# Patient Record
Sex: MALE | Race: WHITE | Employment: FULL TIME | URBAN - NONMETROPOLITAN AREA
[De-identification: names, ages, dates, MRNs, and addresses within clinical notes are randomized per-mention and may not be internally consistent; named-entity substitution may affect disease eponyms.]

---

## 2020-07-31 LAB
BILIRUBIN URINE: NORMAL
BLOOD, URINE: NEGATIVE
CLARITY: CLEAR
COLOR: YELLOW
GLUCOSE URINE: NEGATIVE
KETONES, URINE: NEGATIVE
LEUKOCYTE ESTERASE, URINE: NEGATIVE
NITRITE, URINE: NEGATIVE
PH UA: 7 (ref 4.5–8)
PROTEIN UA: NEGATIVE
SPECIFIC GRAVITY UA: 1.01 (ref 1–1.03)
UROBILINOGEN, URINE: NORMAL

## 2021-04-20 ENCOUNTER — OFFICE VISIT (OUTPATIENT)
Dept: UROLOGY | Age: 41
End: 2021-04-20
Payer: COMMERCIAL

## 2021-04-20 VITALS
HEIGHT: 69 IN | BODY MASS INDEX: 25.42 KG/M2 | WEIGHT: 171.6 LBS | SYSTOLIC BLOOD PRESSURE: 122 MMHG | DIASTOLIC BLOOD PRESSURE: 60 MMHG

## 2021-04-20 DIAGNOSIS — N40.1 BENIGN LOCALIZED PROSTATIC HYPERPLASIA WITH LOWER URINARY TRACT SYMPTOMS (LUTS): ICD-10-CM

## 2021-04-20 DIAGNOSIS — R30.0 DYSURIA: Primary | ICD-10-CM

## 2021-04-20 LAB
BILIRUBIN URINE: NEGATIVE
BLOOD URINE, POC: NEGATIVE
CHARACTER, URINE: CLEAR
COLOR, URINE: YELLOW
GLUCOSE URINE: NEGATIVE MG/DL
KETONES, URINE: ABNORMAL
LEUKOCYTE CLUMPS, URINE: NEGATIVE
NITRITE, URINE: NEGATIVE
PH, URINE: 7 (ref 5–9)
PROTEIN, URINE: NEGATIVE MG/DL
SPECIFIC GRAVITY, URINE: 1.02 (ref 1–1.03)
UROBILINOGEN, URINE: 0.2 EU/DL (ref 0–1)

## 2021-04-20 PROCEDURE — 81003 URINALYSIS AUTO W/O SCOPE: CPT | Performed by: UROLOGY

## 2021-04-20 PROCEDURE — 99204 OFFICE O/P NEW MOD 45 MIN: CPT | Performed by: UROLOGY

## 2021-04-20 RX ORDER — TAMSULOSIN HYDROCHLORIDE 0.4 MG/1
0.4 CAPSULE ORAL DAILY
Qty: 90 CAPSULE | Refills: 3 | Status: SHIPPED | OUTPATIENT
Start: 2021-04-20 | End: 2022-05-12

## 2021-04-20 NOTE — PROGRESS NOTES
NAEDGE Gutierrez MD        [address] [id number]  Dept: [phone]  Dept Fax: [phone]: [address] [address] Urology Office Note -     Patient:  Gonsalo Pagan  YOB: 1980  Date: 4/21/2021    The patient is a 39 y.o. male who presents today for evaluation of the following problems:   Chief Complaint   Patient presents with    Advice Only     new patient dysuria     referred/consultation requested by DENISSE Ballard CNP. HISTORY OF PRESENT ILLNESS:     LUTS    Recently seen Dr. Lopez Duffy at [facility]     Pt presents to the office with  dysuria and frequency of urination. If he holds it he experiences bladder pain and when he tried to urinate he was unable to go. He was put on Mybetriq which did improve the frequency. He still reports bladder pain/weak stream/discomfort/pelvic tightness. He had a uroflow test which was normal. Pt has never been on an abx. Denies neurologic disorders.               Requested/reviewed records from DENISSE Ballard CNP office and/or outside [de-identified]    (Patient's old records have been requested, reviewed and pertinent findings summarized in today's note.)    Procedures Today: N/A      Last several PSA's:  No results found for: PSA    Last total testosterone:  No results found for: TESTOSTERONE    Urinalysis today:  Results for POC orders placed in visit on 04/20/21   POCT Urinalysis No Micro (Auto)   Result Value Ref Range    Glucose, Ur Negative NEGATIVE mg/dl    Bilirubin Urine Negative     Ketones, Urine Trace (A) NEGATIVE    Specific Gravity, Urine 1.025 1.002 - 1.030    Blood, UA POC Negative NEGATIVE    pH, Urine 7.00 5.0 - 9.0    Protein, Urine Negative NEGATIVE mg/dl    Urobilinogen, Urine 0.20 0.0 - 1.0 eu/dl    Nitrite, Urine Negative NEGATIVE    Leukocyte Clumps, Urine Negative NEGATIVE    Color, Urine Yellow YELLOW-STRAW    Character, Prescriptions Ordered:  Orders Placed This Encounter   Medications    tamsulosin (FLOMAX) 0.4 MG capsule     Sig: Take 1 capsule by mouth daily     Dispense:  90 capsule     Refill:  3      Orders Placed:  Orders Placed This Encounter   Procedures    POCT Urinalysis No Micro (Auto)            NADEGE Cast MD

## 2021-05-18 ENCOUNTER — PROCEDURE VISIT (OUTPATIENT)
Dept: UROLOGY | Age: 41
End: 2021-05-18
Payer: COMMERCIAL

## 2021-05-18 VITALS — WEIGHT: 171 LBS | BODY MASS INDEX: 25.33 KG/M2 | HEIGHT: 69 IN

## 2021-05-18 DIAGNOSIS — N40.1 BENIGN LOCALIZED PROSTATIC HYPERPLASIA WITH LOWER URINARY TRACT SYMPTOMS (LUTS): ICD-10-CM

## 2021-05-18 DIAGNOSIS — R30.0 DYSURIA: Primary | ICD-10-CM

## 2021-05-18 LAB
BILIRUBIN URINE: NEGATIVE
BLOOD URINE, POC: NEGATIVE
CHARACTER, URINE: CLEAR
COLOR, URINE: YELLOW
GLUCOSE URINE: NEGATIVE MG/DL
KETONES, URINE: NEGATIVE
LEUKOCYTE CLUMPS, URINE: NEGATIVE
NITRITE, URINE: NEGATIVE
PH, URINE: 7 (ref 5–9)
PROTEIN, URINE: NEGATIVE MG/DL
SPECIFIC GRAVITY, URINE: 1.01 (ref 1–1.03)
UROBILINOGEN, URINE: 0.2 EU/DL (ref 0–1)

## 2021-05-18 PROCEDURE — 52000 CYSTOURETHROSCOPY: CPT | Performed by: UROLOGY

## 2021-05-18 PROCEDURE — 81003 URINALYSIS AUTO W/O SCOPE: CPT | Performed by: UROLOGY

## 2021-05-18 NOTE — PROGRESS NOTES
NADEGE MAHMOOD [facility]        [address] [facility] [phone]  Dept: [phone]  Dept Fax: [phone]: [address] [address] Urology Office Note -     Patient:  Alanis Collado  YOB: 1980  Date: 5/18/2021    The patient is a 39 y.o. male who presents today for evaluation of the following problems:   Chief Complaint   Patient presents with    Follow-up     bph with dysuria-cysto today-sx not improved     referred/consultation requested by DENISSE Willis CNP. HISTORY OF PRESENT ILLNESS:     LUTS    Main complaint Intermittent stream accompanied by dysuria  Retrograde ejaculation with flomax   Urgency improved with flomax. All other symptoms still same  Pt denies any neurologic disorders    Summary of Previous Records:  Recently seen Dr. Jacob Eduardo at [facility]     Pt presents to the office with  dysuria and frequency of urination. If he holds it he experiences bladder pain and when he tried to urinate he was unable to go. He was put on Mybetriq which did improve the frequency. He still reports bladder pain/weak stream/discomfort/pelvic tightness. He had a uroflow test which was normal. Pt has never been on an abx. Denies neurologic disorders. Requested/reviewed records from DENISSE Willis CNP office and/or outside physician/EMR    (Patient's old records have been requested, reviewed and pertinent findings summarized in today's note.)    Procedures Today:             Cystoscopy Operative Note    Patient:  Alanis Collado  MRN: [id number]  YOB: 1980  Surgeon: Damaris Ralph MD  Anesthesia: Urethral 2% Xylocaine   Indications: BPH  Position: Supine    Findings:   The patient was prepped and draped in the usual sterile fashion. The flexible cystoscope was advanced through the urethra and into the bladder.   The bladder was thoroughly inspected and the following was noted: Never Used      (If patient a smoker, smoking cessation counseling offered)   Social History     Substance and Sexual Activity   Alcohol Use Yes       REVIEW OF SYSTEMS:  Constitutional: negative  Eyes: negative  Respiratory: negative  Cardiovascular: negative  Gastrointestinal: negative  Genitourinary: see HPI  Musculoskeletal: negative  Skin: negative   Neurological: negative  Hematological/Lymphatic: negative  Psychological: negative      Physical Exam:    This a 39 y.o. male  There were no vitals filed for this visit. Body mass index is 25.25 kg/m². Constitutional: Patient in no acute distress;       Assessment and Plan        1. Dysuria    2. Benign localized prostatic hyperplasia with lower urinary tract symptoms (LUTS)               Plan:        cystoscopy today. Cysto- cystitis, hypervascular, mild intravesical extension of prostate, LLH +1, - normal cysto otherwise  Continue Flomax if he wants. Pt opts to stop it. Refer to [facility] for pelvic floor therapy. Should try this first before any surgical intervention   Briefly discussed urolift   Return in 2-3 months to reassess symptoms          Prescriptions Ordered:  No orders of the defined types were placed in this encounter.      Orders Placed:  Orders Placed This Encounter   Procedures    POCT Urinalysis No Micro (Auto)    OK CYSTOURETHROSCOPY            NADEGE Mckinnon MD

## 2021-06-02 ENCOUNTER — HOSPITAL ENCOUNTER (OUTPATIENT)
Dept: PHYSICAL THERAPY | Age: 41
Setting detail: THERAPIES SERIES
Discharge: HOME | End: 2021-06-02
Payer: COMMERCIAL

## 2021-06-02 PROCEDURE — 97530 THERAPEUTIC ACTIVITIES: CPT

## 2021-06-02 PROCEDURE — 97161 PT EVAL LOW COMPLEX 20 MIN: CPT

## 2021-06-02 NOTE — PROGRESS NOTES
suffered from [facility] for some time. He noticed it started when he was driving long distances and trying to hold urine after drinking coffee. He had been taking Flowmax which he felt made things worse. Since he stopped taking this med his flow has been better, interrupted stream is not present as often. He notices interruption in stream mostly in morning with coffee. He is not having any trouble getting his stream started at this point. Pt is no longer taking anything for his bladder. Recent cystoscope was unremarkable with exception of narrowing of urethra and cysts that did not concern MD.  Pt states his teacher told him he had a weak bladder when he was in grade school. SUBJECTIVE: See above    Social/Functional History and Current Status:  Medications and Allergies have been reviewed and are listed on Medical History Questionnaire. Chantale Nowak lives with spouse in a single story home with stairs and a handrail to enter. Task Previous Current   ADLs  Independent Independent   IADL's Independent Independent   Ambulation Independent Independent   Transfers Independent Independent   Recreation Independent Independent   Community Integration Independent Independent   Driving Active  Active    Work The pt is a . Full-Time. He does heavy work. 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 When he bends over he gets urge to urinate, pressure in the bladder. Pt has noticed that his penis does retract when he rides in a [place]. When he stands up he feels a strong pull through penis and into lower abdomen. He had to manually release this spasm. This has not happened for 8 years. PAIN    Location Pain when his bladder is full and he tries to withhold, this has been better lately.      Description    Increased by    Decreased by    Maximum Intensity    Best Intensity    Todays Rating    Other/Function        History of Abuse:No history of physical, emotional or sexual abuse reported    BLADDER ASSESSMENT [] Deferred secondary to:   Daily Fluid Ingestion: 1 cup of coffee in morning, water 10 cups or more of water- pt works to stay hydrated. He rarely has a mountain dew. Urination Frequency Times/Day: Morning 4 x in two hour (passes a lot of urine). Pt not sure if he empties. This slows later in the day where pt can go 3-4 hours without voiding excessively. At work he may go longer stretches of time. Suggested voiding every three hours  Times/Night: This has resolved for the most part. He was getting up multiple times per night. Volume Pt estimates good volume   Urge Sensation Urgency has not been common   SYMPTOM QUESTIONNAIRE   Loses Urine Upon: Rarely with heavy lifting and bending at work   Incontinence Volume: Couple dribbles. Frequency of Leakage: Rarely   Wets the Bed: No   Burning/Pain with Urination: Only if stream is intermittent, not present if stream flows well. Difficulty Starting a Stream of Urine: Nothing recent, but pt did have residual volume at the doctors office that he did not feel. He filled a cup afterward with more urine. Incomplete Emptying Yes, but pt does not feel it. Strain to Empty Bladder: No   Falling Out Feeling: No   Urinate more than 7 times/day: Possibly   Use a form of Leakage Protection: No   Restrict Fluid Intake: No   Stream Strength Good, but do experience start and stop occasionally       BOWEL ASSESSMENT [] Deferred secondary to:   Frequency: Was daily but not every three days. Depends on hurry. Most Common Stool Consistency: Soft and long   SYMPTOM QUESTIONNAIRE   Strain to have a BM: Occasionally   Include fiber in your diet: Good diet   Take laxatives/enemas regularly: No   Pain with BM: Gas pains that can get severe. Strong urge to have BM: Rarely, with certain foods. Leak/Stain Feces: No   Diarrhea often:     Pt states he tends toward constipation.        SEXUAL ASSESSMENT [] Deferred secondary to:   Sexually Active Yes, no pain. Pain with [place] (Dyspareunia)    Painful Penetration    Lubrication Needed    Pain After [place]            GENERAL ASSESSMENT   [] Deferred secondary to:   Palpation    Observation    Posture Mild forward head and rounded shoulders   Range of Motion WFL LE's and spine, note loss of flexibility at end rage hips   Strength LE's 5/5, lower core 4/5   Gait NL   Sensation Intact   Edema None   Balance/Fall History NA   Special Tests Moderate tightness in the hip girdle           OBSERVATION  [] Deferred secondary to:   Patient Safety Patient offered a chaperone and declined. This pt was educated in the purpose and procedure of PFM examination to be completed today. He was educated in his right to refuse part or all of this activity, and to stop this activity if he becomes uncomfortable. He was educated that he is not obligated to give a reason and that it would not in any way impact his ability to seek further care with this therapist at this facility or result in therapist prejudice regarding his motivation to get better. He verbalized understanding and gave consent again for PFM examination to be completed today. Skin Condition Excellent   Urogenital Triangle Normal   Introitus     Perineal Descent     External Clock         PELVIC FLOOR INTERNAL EXAM [] Deferred secondary to:   Exam External   Sensation Intact   Muscle Localization Excellent once cues given   Palpation/Tone Elevated-moderate, delayed relaxation and incomplete last 30%. Pt demonstrates overall elevated tension of the PFM palpable through the skin with very mild tenderness accompanying this. Pelvic Floor Strength 2/5   Relax after Contraction Delayed, difficult,  Pt could perceive relaxation.      Prolapse NA           TREATMENT   Precautions:     X in shaded column indicates activity completed today   Modalities Parameters/  Location  Notes                     Manual Therapy Time/Technique  Notes         Education in manual therapy reynold-scrotal area TEACH NEXT VISIT           Exercise/Intervention    Notes   PFM a and P-inverse relationship between PFM and bladder  15' x    Tension and anxiety component of PFM tension  5' x    Camel bladder  5' x The pt is to void every 3 hours. Kegels quick and hold  5' x The pt holding 5 sec   PFM relaxation  5' x Belly relaxed, jaw relaxed, shoulders relaxed, sitting on toilet instead of standing. Importance of relaxing to void especially. Constipation    Comprehensive   Normal bladder function       Diet Impact on the bladder                     Adductor stretch       PFM stretch       Deep squat       OI stretch       Piriformis stretch                                          Pt questions  5' x      Specific Interventions Next Treatment: Pt education followed by stretching, manual therapy, and strengthening as needed    Activity/Treatment Tolerance:  [x]  Patient tolerated treatment well  []  Patient limited by fatigue  []  Patient limited by pain   []  Patient limited by medical complications  []  Other:     Patient Education: This pt was educated in the anatomy and viscera of the pelvic floor to promote insight into her own condition and to assist her in the identification of causative factors. The inverse relationship between the pelvic floor and viscera. He was educated in the importance of pelvic floor muscle relaxation to attain pain relief, improved bowel and bladder function using the model. Importance of PFM relaxation was stressed to the pt. The pt verbalized understanding of all. [x]  HEP/Education Completed:    VidSys Access Code:  []  No new Education completed  []  Reviewed Prior HEP      []  Patient verbalized and/or demonstrated understanding of education provided. []  Patient unable to verbalize and/or demonstrate understanding of education provided. Will continue education.   []  Barriers to learning:    Assessment: This pt presents to therapy with complaints of dysuria, intermittent stream, periods of urinary frequency, nocturia, constipation, and stress incontinence with bending at work (this is accompanied by feeling of pressure in the bladder). 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 Testing has revealed residual volume-significant amounts per pt. Upon examination he demonstrates PFM weakness and very high PFM tone, difficulty relaxing the PFM (though he can perceive relaxation), loss of PFM endurance, restricted hip girdle flexibility, and lack of insight into factors affecting condition expected of a lay person. These findings do support pt claim of difficulty in passing urine or maintaining urine stream, and tending toward constipation even though his stool is soft. He requires skilled PT to address these impairments with focus on normalizing PFM tone in order to facilitate bowel and bladder contraction for complete and easy empty of both. Without further PT pt is at risk for surgery (which has been suggested pending result of PT) and UTI due to retention of urine. Body Structures/Functions/Activity Limitations: PFM weakness with decreased localization and endurance, Poor PFM control with limited ability to completely relax, Decreased awareness of normal bladder and bowel habits, control, and diet effects on functioning, Abdominal and core weakness, Impaired coordination, Impaired muscle tone and Impaired strength  Prognosis: Good    GOALS:  Patient Goal: To improve ease of emptying the bladder and bowel  Short Term Goals: 6 weeks  1. Pt will be able to identify normal bladder/bowel function/voiding patterns, diet impact on the bladder/and bowel to enhance pt insight into potential causative factors and promote increased bladder control.      2. The pt will demonstrate well localized PFM contraction with ability to completely relax instantly in order to increase the efficacy of strengthening program and facilitate bowel and bladder contraction. 3. Pt will demonstrate independence with basic HEP designed to increase PFM and core strength and to enhance hip girdle flexibility for increased ease of strengthening. 5.  This pt will reduce nocturia to 1 times per night consistently to establish more restful sleep patterns. 6.  This pt will demo proper use of pelvic brace lifting, push, and pull with pelvic brace in order to promote continence during functional tasks. 7.  Pt to empty bladder every three hours in order to promote return of normal bladder sensation for improved emptying. Long Term Goals: 12 weeks  1. This pt will report a resolution of incontinence with bending and lifting at work to keep pt dry at his very labor intensive job  2. This pt will report normal urinary frequency with consistent complete empty to promote bladder health. 3. This pt will decrease IIQ and IUD scores to 0 respectively to indicate improved continence and efficacy of treatment  4. This pt will demo independence with advanced HEP in order to allow gains in therapy to be maintained long term and reduce the risk of further medical need/assessment. 5. This pt will demo PFM PERF 3/10/10/10 in order to increase pelvic organ support  6. The pt will abolish nocturia in order to attain restful sleep patterns. 7. The pt will verbalize constipation symptoms improved by 25% in order to reduce strain on the pelvic floor. PLAN:  Treatment Recommendations: Strengthening, Range of Motion, Balance Training, Transfer Training, Endurance Training, Gait Training, Stair Training, Neuromuscular Re-education, Manual Therapy - Soft Tissue Mobilization, Manual Therapy - Joint Manipulation, Pain Management, Home Exercise Program, Patient Education, Self-Care Education and Training and Modalities    [x]  Plan of care initiated.   Plan to see patient 1 times every other week for 12 weeks to address the treatment planned outlined above.   []  Continue with current plan of care  []  Modify plan of care as follows:    []  Hold pending physician visit  []  Discharge    Time In 1400   Time Out 1500   Timed Code Minutes: 40   Total Treatment Time: 60       Electronically Signed by: Lina Morrison PT

## 2021-06-15 ENCOUNTER — HOSPITAL ENCOUNTER (OUTPATIENT)
Dept: PHYSICAL THERAPY | Age: 41
Setting detail: THERAPIES SERIES
Discharge: HOME | End: 2021-06-15
Payer: COMMERCIAL

## 2021-06-15 PROCEDURE — 97530 THERAPEUTIC ACTIVITIES: CPT

## 2021-06-15 PROCEDURE — 97110 THERAPEUTIC EXERCISES: CPT

## 2021-06-15 NOTE — PROGRESS NOTES
[address] [facility]  PHYSICAL THERAPY  OUTPATIENT REHABILITATION - SPECIALIZED THERAPY SERVICES  [] PELVIC HEALTH EVALUATION  [x] DAILY NOTE  [] PROGRESS NOTE [] DISCHARGE NOTE    Date: 6/15/2021  Patient Name:  Mo Worthy  : 1980  MRN: [id number]  CSN: [id number]    Referring Practitioner Cayla Roman MD   Diagnosis Dysuria [R30.0]    Treatment Diagnosis M62.838, N39.3 - Stress Incontinence male  R35.1 - Nocturia  R35.0 - Frequency of Micturition  R39.12 - Poor Urinary Stream  R39.81 - Functional Urinary Incontinence  N40.1 - Enlarged Prostate with Lower Urinary Tract Symptoms  R39.11 - Hesitance of Micturition and K55.09 - Other Constipation  K59.00 - Constipation, Unspecified   Date of Evaluation 21    Additional Pertinent History BPH, pt was taking Flowmax but opted to discontinue, MD mckeon      Functional Outcome Measure Used IIQ and TRA   Functional Outcome Score 2 and 6 (21)       Insurance: Primary: Payor: Lopez Curry 150 /  /  / ,   Secondary:    Authorization Information: PRE CERTIFICATION REQUIRED: yes through [employer] [phone]  INSURANCE THERAPY BENEFIT:  20 visits per calendar year 0 used   Visit # 2, 2/10 for progress note   Visits Allowed: 21 Max, await AIM response   Recertification Date:    Physician Follow-Up:    Physician Orders:    History of Present Illness: The pt states that he has been suffered from [facility] for some time. He noticed it started when he was driving long distances and trying to hold urine after drinking coffee. He had been taking Flowmax which he felt made things worse. Since he stopped taking this med his flow has been better, interrupted stream is not present as often. He notices interruption in stream mostly in morning with coffee. He is not having any trouble getting his stream started at this point. Pt is no longer taking anything for his bladder.   Recent cystoscope was unremarkable with exception of narrowing of urethra and cysts that did not concern MD.  Pt states his teacher told him he had a weak bladder when he was in grade school. SUBJECTIVE:  The pt states that he is doing \"great\". He is able to do kegels when he thinks about it. Pt has had no urgency or pain other than a small amount of pain when stream stops. He notices that when he relaxes he is able to get more urine out. He does feel the bladder when bending over or kneeling down and this makes him feel bladder not emptying completely. The pt feels that frequency in the morning is due to coffee. Social/Functional History and Current Status:  Medications and Allergies have been reviewed and are listed on Medical History Questionnaire. Yolanda Ruggiero lives with spouse in a single story home with stairs and a handrail to enter. Task Previous Current   ADLs  Independent Independent   IADL's Independent Independent   Ambulation Independent Independent   Transfers Independent Independent   Recreation Independent Independent   Community Integration Independent Independent   Driving Active  Active    Work The pt is a . Full-Time. He does heavy work. 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 When he bends over he gets urge to urinate, pressure in the bladder. Pt has noticed that his penis does retract when he rides in a [place]. When he stands up he feels a strong pull through penis and into lower abdomen. He had to manually release this spasm. This has not happened for 8 years. PAIN    Location Pain when his bladder is full and he tries to withhold, this has been better lately.      Description    Increased by    Decreased by    Maximum Intensity    Best Intensity    Todays Rating    Other/Function        History of Abuse:No history of physical, emotional or sexual abuse reported    BLADDER ASSESSMENT [] Deferred secondary to:   Daily Fluid Ingestion: 1 cup of coffee in morning, water 10 cups or more of water- pt works to stay hydrated. He rarely has a mountain dew. Urination Frequency Times/Day: Morning 4 x in two hour (passes a lot of urine). Pt not sure if he empties. This slows later in the day where pt can go 3-4 hours without voiding excessively. At work he may go longer stretches of time. Suggested voiding every three hours  Times/Night: This has resolved for the most part. He was getting up multiple times per night. Volume Pt estimates good volume   Urge Sensation Urgency has not been common   SYMPTOM QUESTIONNAIRE   Loses Urine Upon: Rarely with heavy lifting and bending at work   Incontinence Volume: Couple dribbles. Frequency of Leakage: Rarely   Wets the Bed: No   Burning/Pain with Urination: Only if stream is intermittent, not present if stream flows well. Difficulty Starting a Stream of Urine: Nothing recent, but pt did have residual volume at the doctors office that he did not feel. He filled a cup afterward with more urine. Incomplete Emptying Yes, but pt does not feel it. Strain to Empty Bladder: No   Falling Out Feeling: No   Urinate more than 7 times/day: Possibly   Use a form of Leakage Protection: No   Restrict Fluid Intake: No   Stream Strength Good, but do experience start and stop occasionally       BOWEL ASSESSMENT [] Deferred secondary to:   Frequency: Was daily but not every three days. Depends on hurry. Most Common Stool Consistency: Soft and long   SYMPTOM QUESTIONNAIRE   Strain to have a BM: Occasionally   Include fiber in your diet: Good diet   Take laxatives/enemas regularly: No   Pain with BM: Gas pains that can get severe. Strong urge to have BM: Rarely, with certain foods. Leak/Stain Feces: No   Diarrhea often:     Pt states he tends toward constipation. SEXUAL ASSESSMENT [] Deferred secondary to:   Sexually Active Yes, no pain.    Pain with [place] (Dyspareunia)    Painful Penetration    Lubrication Needed    Pain After [place] GENERAL ASSESSMENT   [] Deferred secondary to:   Palpation    Observation    Posture Mild forward head and rounded shoulders   Range of Motion WFL LE's and spine, note loss of flexibility at end rage hips   Strength LE's 5/5, lower core 4/5   Gait NL   Sensation Intact   Edema None   Balance/Fall History NA   Special Tests Moderate tightness in the hip girdle           OBSERVATION  [] Deferred secondary to:   Patient Safety Patient offered a chaperone and declined. This pt was educated in the purpose and procedure of PFM examination to be completed today. He was educated in his right to refuse part or all of this activity, and to stop this activity if he becomes uncomfortable. He was educated that he is not obligated to give a reason and that it would not in any way impact his ability to seek further care with this therapist at this facility or result in therapist prejudice regarding his motivation to get better. He verbalized understanding and gave consent again for PFM examination to be completed today. Skin Condition Excellent   Urogenital Triangle Normal   Introitus     Perineal Descent     External Clock         PELVIC FLOOR INTERNAL EXAM [] Deferred secondary to:   Exam External   Sensation Intact   Muscle Localization Excellent once cues given   Palpation/Tone Elevated-moderate, delayed relaxation and incomplete last 30%. Pt demonstrates overall elevated tension of the PFM palpable through the skin with very mild tenderness accompanying this. Pelvic Floor Strength 2/5   Relax after Contraction Delayed, difficult,  Pt could perceive relaxation.      Prolapse NA           TREATMENT   Precautions:     X in shaded column indicates activity completed today   Modalities Parameters/  Location  Notes                     Manual Therapy Time/Technique  Notes         Education in manual therapy reynold-scrotal area TEACH NEXT VISIT           Exercise/Intervention    Notes   PFM a and P-inverse relationship between PFM and bladder  15' x    Tension and anxiety component of PFM tension  5' x    Camel bladder  5' x The pt is to void every 3 hours. Kegels quick and hold  5' x The pt holding 5 sec   PFM relaxation  5' x Belly relaxed, jaw relaxed, shoulders relaxed, sitting on toilet instead of standing. Importance of relaxing to void especially. Constipation    Comprehensive   Normal bladder function  5' x    Diet Impact on the bladder  5' x                  Adductor stretch  3 x 30\" x    PFM stretch  3 x 30\" x    Deep squat       OI stretch  3 x 30\" x    Piriformis stretch  3 x 30\" x    Hamstring stretch  3 x 30\" x           Kegel with squat and release       Self manual therapy              Pt questions  5' x      Specific Interventions Next Treatment: Pt education followed by stretching, manual therapy, and strengthening as needed    Activity/Treatment Tolerance:  [x]  Patient tolerated treatment well  []  Patient limited by fatigue  []  Patient limited by pain   []  Patient limited by medical complications  []  Other:     Patient Education: The pt was given handouts of all exercises and these were reviewed with the pt with notations added by PT to enhance pt understanding and ease of follow through at home. [x]  HEP/Education Completed:    [employer] Access Code:  []  No new Education completed  []  Reviewed Prior HEP      [x]  Patient verbalized and/or demonstrated understanding of education provided. []  Patient unable to verbalize and/or demonstrate understanding of education provided. Will continue education. []  Barriers to learning:    Assessment: This pt presents to therapy with improved symptoms in all areas. He feels that relaxation of his PFM has been going well and is correlating to improved urinary function. He completed stretching today with very good tolerance and with stretch felt in the target area.   He requires continued skilled PT in order to address remaining soft tissue tension for resolution of urinary symptoms as fully as possible. Body Structures/Functions/Activity Limitations: PFM weakness with decreased localization and endurance, Poor PFM control with limited ability to completely relax, Decreased awareness of normal bladder and bowel habits, control, and diet effects on functioning, Abdominal and core weakness, Impaired coordination, Impaired muscle tone and Impaired strength  Prognosis: Good    GOALS:  Patient Goal: To improve ease of emptying the bladder and bowel  Short Term Goals: 6 weeks  1. Pt will be able to identify normal bladder/bowel function/voiding patterns, diet impact on the bladder/and bowel to enhance pt insight into potential causative factors and promote increased bladder control. 2. The pt will demonstrate well localized PFM contraction with ability to completely relax instantly in order to increase the efficacy of strengthening program and facilitate bowel and bladder contraction. 3. Pt will demonstrate independence with basic HEP designed to increase PFM and core strength and to enhance hip girdle flexibility for increased ease of strengthening. 5.  This pt will reduce nocturia to 1 times per night consistently to establish more restful sleep patterns. 6.  This pt will demo proper use of pelvic brace lifting, push, and pull with pelvic brace in order to promote continence during functional tasks. 7.  Pt to empty bladder every three hours in order to promote return of normal bladder sensation for improved emptying. Long Term Goals: 12 weeks  1. This pt will report a resolution of incontinence with bending and lifting at work to keep pt dry at his very labor intensive job  2. This pt will report normal urinary frequency with consistent complete empty to promote bladder health. 3. This pt will decrease IIQ and IUD scores to 0 respectively to indicate improved continence and efficacy of treatment  4.  This pt will demo independence with advanced HEP in order to allow gains in therapy to be maintained long term and reduce the risk of further medical need/assessment. 5. This pt will demo PFM PERF 3/10/10/10 in order to increase pelvic organ support  6. The pt will abolish nocturia in order to attain restful sleep patterns. 7. The pt will verbalize constipation symptoms improved by 25% in order to reduce strain on the pelvic floor. PLAN:  Treatment Recommendations: Strengthening, Range of Motion, Balance Training, Transfer Training, Endurance Training, Gait Training, Stair Training, Neuromuscular Re-education, Manual Therapy - Soft Tissue Mobilization, Manual Therapy - Joint Manipulation, Pain Management, Home Exercise Program, Patient Education, Self-Care Education and Training and Modalities    []  Plan of care initiated. Plan to see patient 1 times every other week for 12 weeks to address the treatment planned outlined above.   [x]  Continue with current plan of care  []  Modify plan of care as follows:    []  Hold pending physician visit  []  Discharge    Time In 1600   Time Out 1700   Timed Code Minutes: 60   Total Treatment Time: 60       Electronically Signed by: Merrill Nicole, PT

## 2021-06-29 ENCOUNTER — APPOINTMENT (OUTPATIENT)
Dept: PHYSICAL THERAPY | Age: 41
End: 2021-06-29
Payer: COMMERCIAL

## 2021-07-07 NOTE — PROGRESS NOTES
cysts that did not concern MD.  Pt states his teacher told him he had a weak bladder when he was in grade school. SUBJECTIVE:  The pt states that he is doing \"great\". He is able to do kegels when he thinks about it. Pt has had no urgency or pain other than a small amount of pain when stream stops. He notices that when he relaxes he is able to get more urine out. He does feel the bladder when bending over or kneeling down and this makes him feel bladder not emptying completely. The pt feels that frequency in the morning is due to coffee. Social/Functional History and Current Status:  Medications and Allergies have been reviewed and are listed on Medical History Questionnaire. Ari Coelho lives with spouse in a single story home with stairs and a handrail to enter. Task Previous Current   ADLs  Independent Independent   IADL's Independent Independent   Ambulation Independent Independent   Transfers Independent Independent   Recreation Independent Independent   Community Integration Independent Independent   Driving Active  Active    Work The pt is a . Full-Time. He does heavy work. 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 When he bends over he gets urge to urinate, pressure in the bladder. Pt has noticed that his penis does retract when he rides in a [place]. When he stands up he feels a strong pull through penis and into lower abdomen. He had to manually release this spasm. This has not happened for 8 years. PAIN    Location Pain when his bladder is full and he tries to withhold, this has been better lately. Description    Increased by    Decreased by    Maximum Intensity    Best Intensity    Todays Rating    Other/Function        History of Abuse:No history of physical, emotional or sexual abuse reported    BLADDER ASSESSMENT [] Deferred secondary to: Information below from evaluation, not tested today.   For reference only on this daily note. Daily Fluid Ingestion: 1 cup of coffee in morning, water 10 cups or more of water- pt works to stay hydrated. He rarely has a mountain dew. Urination Frequency Times/Day: Morning 4 x in two hour (passes a lot of urine). Pt not sure if he empties. This slows later in the day where pt can go 3-4 hours without voiding excessively. At work he may go longer stretches of time. Suggested voiding every three hours  Times/Night: This has resolved for the most part. He was getting up multiple times per night. Volume Pt estimates good volume   Urge Sensation Urgency has not been common   SYMPTOM QUESTIONNAIRE   Loses Urine Upon: Rarely with heavy lifting and bending at work   Incontinence Volume: Couple dribbles. Frequency of Leakage: Rarely   Wets the Bed: No   Burning/Pain with Urination: Only if stream is intermittent, not present if stream flows well. Difficulty Starting a Stream of Urine: Nothing recent, but pt did have residual volume at the doctors office that he did not feel. He filled a cup afterward with more urine. Incomplete Emptying Yes, but pt does not feel it. Strain to Empty Bladder: No   Falling Out Feeling: No   Urinate more than 7 times/day: Possibly   Use a form of Leakage Protection: No   Restrict Fluid Intake: No   Stream Strength Good, but do experience start and stop occasionally       BOWEL ASSESSMENT [] Deferred secondary to: Information below from evaluation, not tested today. For reference only on this daily note. Frequency: Was daily but not every three days. Depends on hurry. Most Common Stool Consistency: Soft and long   SYMPTOM QUESTIONNAIRE   Strain to have a BM: Occasionally   Include fiber in your diet: Good diet   Take laxatives/enemas regularly: No   Pain with BM: Gas pains that can get severe. Strong urge to have BM: Rarely, with certain foods.      Leak/Stain Feces: No   Diarrhea often:     Pt states he tends toward constipation. GENERAL ASSESSMENT   [] Deferred secondary to: Information below from evaluation, not tested today. For reference only on this daily note. Palpation    Observation    Posture Mild forward head and rounded shoulders   Range of Motion WFL LE's and spine, note loss of flexibility at end rage hips   Strength LE's 5/5, lower core 4/5   Gait NL   Sensation Intact   Edema None   Balance/Fall History NA   Special Tests Moderate tightness in the hip girdle           OBSERVATION  [] Deferred secondary to:   Patient Safety Patient offered a chaperone and declined. This pt was educated in the purpose and procedure of PFM examination to be completed today. He was educated in his right to refuse part or all of this activity, and to stop this activity if he becomes uncomfortable. He was educated that he is not obligated to give a reason and that it would not in any way impact his ability to seek further care with this therapist at this facility or result in therapist prejudice regarding his motivation to get better. He verbalized understanding and gave consent again for PFM examination to be completed today. Skin Condition Excellent   Urogenital Triangle Normal   Introitus     Perineal Descent     External Clock         PELVIC FLOOR INTERNAL EXAM [] Deferred secondary to: Information below from evaluation, not tested today. For reference only on this daily note. Exam External   Sensation Intact   Muscle Localization Excellent once cues given   Palpation/Tone Elevated-moderate, delayed relaxation and incomplete last 30%. Pt demonstrates overall elevated tension of the PFM palpable through the skin with very mild tenderness accompanying this. Pelvic Floor Strength 2/5   Relax after Contraction Delayed, difficult,  Pt could perceive relaxation.      Prolapse NA           TREATMENT   Precautions:     X in shaded column indicates activity completed today   Modalities Parameters/  Location Notes                     Manual Therapy Time/Technique  Notes         Education in manual therapy reynold-scrotal area TEACH NEXT VISIT           Exercise/Intervention    Notes   PFM a and P-inverse relationship between PFM and bladder  15' x    Tension and anxiety component of PFM tension  5' x    Camel bladder  5' x The pt is to void every 3 hours. Kegels quick and hold  5' x The pt holding 5 sec   PFM relaxation  5' x Belly relaxed, jaw relaxed, shoulders relaxed, sitting on toilet instead of standing. Importance of relaxing to void especially. Constipation    Comprehensive   Normal bladder function  5' x    Diet Impact on the bladder  5' x                  Adductor stretch  3 x 30\" x    PFM stretch  3 x 30\" x    Deep squat       OI stretch  3 x 30\" x    Piriformis stretch  3 x 30\" x    Hamstring stretch  3 x 30\" x           Kegel with squat and release       Self manual therapy              Pt questions  5' x      Specific Interventions Next Treatment: Pt education followed by stretching, manual therapy, and strengthening as needed    Activity/Treatment Tolerance:  [x]  Patient tolerated treatment well  []  Patient limited by fatigue  []  Patient limited by pain   []  Patient limited by medical complications  []  Other:     Patient Education: The pt was given handouts of all exercises and these were reviewed with the pt with notations added by PT to enhance pt understanding and ease of follow through at home. [x]  HEP/Education Completed:    [place] Access Code:  []  No new Education completed  []  Reviewed Prior HEP      [x]  Patient verbalized and/or demonstrated understanding of education provided. []  Patient unable to verbalize and/or demonstrate understanding of education provided. Will continue education. []  Barriers to learning:    Assessment: This pt presents to therapy with improved symptoms in all areas.   He feels that relaxation of his PFM has been going well and is correlating to improved urinary function. He completed stretching today with very good tolerance and with stretch felt in the target area. He requires continued skilled PT in order to address remaining soft tissue tension for resolution of urinary symptoms as fully as possible. Body Structures/Functions/Activity Limitations: PFM weakness with decreased localization and endurance, Poor PFM control with limited ability to completely relax, Decreased awareness of normal bladder and bowel habits, control, and diet effects on functioning, Abdominal and core weakness, Impaired coordination, Impaired muscle tone and Impaired strength  Prognosis: Good    GOALS:  Patient Goal: To improve ease of emptying the bladder and bowel  Short Term Goals: 6 weeks  1. Pt will be able to identify normal bladder/bowel function/voiding patterns, diet impact on the bladder/and bowel to enhance pt insight into potential causative factors and promote increased bladder control. -MET     2. The pt will demonstrate well localized PFM contraction with ability to completely relax instantly in order to increase the efficacy of strengthening program and facilitate bowel and bladder contraction. -MET  3. Pt will demonstrate independence with basic HEP designed to increase PFM and core strength and to enhance hip girdle flexibility for increased ease of strengthening. -MET   5. This pt will reduce nocturia to 1 times per night consistently to establish more restful sleep patterns.-In progress    6. This pt will demo proper use of pelvic brace lifting, push, and pull with pelvic brace in order to promote continence during functional tasks. -MET  7. Pt to empty bladder every three hours in order to promote return of normal bladder sensation for improved emptying. -MET           Long Term Goals: 12 weeks  1. This pt will report a resolution of incontinence with bending and lifting at work to keep pt dry at his very labor intensive job -In progress  2.  This pt will report normal urinary frequency with consistent complete empty to promote bladder health.  -In progress  3. This pt will decrease IIQ and IUD scores to 0 respectively to indicate improved continence and efficacy of treatment -In progress currently 0 and 3  4. This pt will demo independence with advanced HEP in order to allow gains in therapy to be maintained long term and reduce the risk of further medical need/assessment.  -Will complete next visit  5. This pt will demo PFM PERF 3/10/10/10 in order to increase pelvic organ support- in progress  6. The pt will abolish nocturia in order to attain restful sleep patterns.-In progress    7. The pt will verbalize constipation symptoms improved by 25% in order to reduce strain on the pelvic floor. -In progress     PLAN:  Treatment Recommendations: Strengthening, Range of Motion, Balance Training, Transfer Training, Endurance Training, Gait Training, Stair Training, Neuromuscular Re-education, Manual Therapy - Soft Tissue Mobilization, Manual Therapy - Joint Manipulation, Pain Management, Home Exercise Program, Patient Education, Self-Care Education and Training and Modalities    []  Plan of care initiated. Plan to see patient 1 times every other week for 12 weeks to address the treatment planned outlined above. A progress note was completed from visit note 6/15/21 in order to extend the date of his coverage through next visit which is likely to be discharge if all is still going well and HEP can be completed.     [x]  Continue with current plan of care  []  Modify plan of care as follows:    []  Hold pending physician visit  []  Discharge    Time In 1600   Time Out 1700   Timed Code Minutes: 60   Total Treatment Time: 60       Electronically Signed by: Sergio Montgomery, PT

## 2021-07-13 ENCOUNTER — OFFICE VISIT (OUTPATIENT)
Dept: UROLOGY | Age: 41
End: 2021-07-13
Payer: COMMERCIAL

## 2021-07-13 VITALS — BODY MASS INDEX: 25.13 KG/M2 | WEIGHT: 169.7 LBS | RESPIRATION RATE: 16 BRPM | HEIGHT: 69 IN

## 2021-07-13 DIAGNOSIS — R30.0 DYSURIA: Primary | ICD-10-CM

## 2021-07-13 DIAGNOSIS — R31.29 MICROHEMATURIA: ICD-10-CM

## 2021-07-13 DIAGNOSIS — N40.1 BENIGN LOCALIZED PROSTATIC HYPERPLASIA WITH LOWER URINARY TRACT SYMPTOMS (LUTS): ICD-10-CM

## 2021-07-13 LAB
BILIRUBIN URINE: NEGATIVE
BLOOD URINE, POC: NORMAL
CHARACTER, URINE: CLEAR
COLOR, URINE: YELLOW
GLUCOSE URINE: NEGATIVE MG/DL
KETONES, URINE: NEGATIVE
LEUKOCYTE CLUMPS, URINE: NEGATIVE
NITRITE, URINE: NEGATIVE
PH, URINE: 7 (ref 5–9)
PROTEIN, URINE: NEGATIVE MG/DL
SPECIFIC GRAVITY, URINE: 1.02 (ref 1–1.03)
UROBILINOGEN, URINE: 0.2 EU/DL (ref 0–1)

## 2021-07-13 PROCEDURE — 81003 URINALYSIS AUTO W/O SCOPE: CPT | Performed by: UROLOGY

## 2021-07-13 PROCEDURE — 99213 OFFICE O/P EST LOW 20 MIN: CPT | Performed by: UROLOGY

## 2021-07-13 NOTE — PROGRESS NOTES
NADEGE Ott MD        [address] [facility] [id number]  Dept: [phone]  Dept Fax: [phone]: [address] [address] Urology Office Note -     Patient:  Harry Nguyen  YOB: 1980  Date: 7/13/2021    The patient is a 39 y.o. male who presents today for evaluation of the following problems:   Chief Complaint   Patient presents with    Follow-up     dysuria    referred/consultation requested by DENISSE Freitas CNP. HISTORY OF PRESENT ILLNESS:     LUTS  Doing better since starting pelvic floor therapy and cutting back on caffeine. Urinating well. Decreased frequency, decreased urgency. Dysuria resolved. No hematuria. No pelvic pain. S/p cystoscope 05/18/21- cystitis, hypervascular, mild intravesical extension of prostate, LLH +1, - normal cysto otherwise. UA this visit showed trace blood. Otherwise WNLs. Was on flomax in the past, but it made things worse.      Requested/reviewed records from DENISSE Freitas CNP office and/or outside [de-identified]    (Patient's old records have been requested, reviewed and pertinent findings summarized in today's note.)    Procedures Today: N/A      Last several PSA's:  No results found for: PSA    Last total testosterone:  No results found for: TESTOSTERONE    Urinalysis today:  Results for POC orders placed in visit on 07/13/21   POCT Urinalysis No Micro (Auto)   Result Value Ref Range    Glucose, Ur Negative NEGATIVE mg/dl    Bilirubin Urine Negative     Ketones, Urine Negative NEGATIVE    Specific Gravity, Urine 1.020 1.002 - 1.030    Blood, UA POC Trace-intact NEGATIVE    pH, Urine 7.00 5.0 - 9.0    Protein, Urine Negative NEGATIVE mg/dl    Urobilinogen, Urine 0.20 0.0 - 1.0 eu/dl    Nitrite, Urine Negative NEGATIVE    Leukocyte Clumps, Urine Negative NEGATIVE    Color, Urine Yellow YELLOW-STRAW    Character, Urine Clear [id number].[id number] Last BUN and creatinine:  No results found for: BUN  No results found for: CREATININE      Imaging Reviewed during this Office Visit:   Melissa Muñoz MD independently reviewed the images and verified the radiology reports from:    No results found. PAST MEDICAL, FAMILY AND SOCIAL HISTORY:  History reviewed. No pertinent past medical history. History reviewed. No pertinent surgical history. Family History   Problem Relation Age of Onset    No Known Problems Father     Cancer Maternal Grandmother     Heart Disease Maternal Grandmother     Diabetes Maternal Grandmother      No outpatient medications have been marked as taking for the 21 encounter (Office Visit) with Mickey Chow MD.       Patient has no known allergies. Social History     Tobacco Use   Smoking Status Former Smoker    Quit date:     Years since quittin.5   Smokeless Tobacco Never Used      (If patient a smoker, smoking cessation counseling offered)   Social History     Substance and Sexual Activity   Alcohol Use Yes       REVIEW OF SYSTEMS:  Constitutional: negative  Eyes: negative  Respiratory: negative  Cardiovascular: negative  Gastrointestinal: negative  Genitourinary: see HPI  Musculoskeletal: negative  Skin: negative   Neurological: negative  Hematological/Lymphatic: negative  Psychological: negative      Physical Exam:    This a 39 y.o. male  Vitals:    21 1630   Resp: 16     Body mass index is 25.06 kg/m². Constitutional: Patient in no acute distress;       Assessment and Plan        1. Dysuria    2. Benign localized prostatic hyperplasia with lower urinary tract symptoms (LUTS)    3. Microhematuria          Plan:      LUTS - Improved. Cysto 21- cystitis, hypervascular, mild intravesical extension of prostate, LLH +1, - normal cysto otherwise  Stopped flomax due to retrograde ejaculation  Continue Pelvic floor therapy. This is one of the only things that has been helpful.  With conservative management and stress relief he has seen much improvement  Microhematuria- workup negative in past.      Follow up in 1 year with repeat UA with junior    Prescriptions Ordered:  No orders of the defined types were placed in this encounter.      Orders Placed:  Orders Placed This Encounter   Procedures    POCT Urinalysis No Micro (Auto)            NADEGE MAHMOOD [facility], [place], MD

## 2021-07-14 ENCOUNTER — HOSPITAL ENCOUNTER (OUTPATIENT)
Dept: PHYSICAL THERAPY | Age: 41
Setting detail: THERAPIES SERIES
Discharge: HOME | End: 2021-07-14
Payer: COMMERCIAL

## 2021-07-14 NOTE — DISCHARGE SUMMARY
[facility]  PHYSICAL THERAPY QUICK DISCHARGE NOTE  OUTPATIENT  [facility]  Patient Name: Alexandrea Abdalla        CSN: [id number]   YOB: 1980  Gender: male  Ellena Rubinstein, MD,    Dysuria [R30.0] ,      Patient is discharged from Physical Therapy services at this time. See last note for details related to results of therapy and goal achievement. Reason for discharge: Pt had been unable to attend recently due to work issues. He feels he is able to continue on his own with HEP and is doing well.   Maryam Bravo, MPT

## 2022-04-13 ENCOUNTER — TELEPHONE (OUTPATIENT)
Dept: UROLOGY | Age: 42
End: 2022-04-13

## 2022-04-13 NOTE — TELEPHONE ENCOUNTER
Roland swartz CNP from [facility] stated patient patient c/o lymph node edema and has US that showed lymph node enlargement. They did repeat a US in March and it still showed bilateral lymph node enlargement. He never follow up with his PCP as instructed. Do you want to work it up or do they need to refer him somewhere else. He has a follow up on 07/12/2022    Please advise.  Thank you

## 2022-04-14 NOTE — TELEPHONE ENCOUNTER
Called [facility], No imaging, [address] [facility], [phone], They do not do imaging.

## 2022-04-14 NOTE — TELEPHONE ENCOUNTER
Attempted to call the patient to schedule appointment with DR Allegra Jack. Voicemail left to return the call to the office.

## 2022-04-14 NOTE — TELEPHONE ENCOUNTER
Received Imaging and office note, scanned in Epic, routed to [facility].

## 2022-04-14 NOTE — TELEPHONE ENCOUNTER
He had the the imaging completed at [facility]. Rod Caldwell from [facility] will fax over the office notes and Ultrasound results.

## 2022-04-18 NOTE — TELEPHONE ENCOUNTER
Attempted call the patient to schedule the appointment. Voicemail left to return the call to the office.

## 2022-04-19 NOTE — TELEPHONE ENCOUNTER
Attempted to call the patient. Voicemail left to return the call to the office to schedule the follow up appointment. Spoke to patient wife. She will have him call back to schedule the appointment.

## 2022-05-12 ENCOUNTER — OFFICE VISIT (OUTPATIENT)
Dept: UROLOGY | Age: 42
End: 2022-05-12
Payer: COMMERCIAL

## 2022-05-12 VITALS
WEIGHT: 167 LBS | HEIGHT: 69 IN | SYSTOLIC BLOOD PRESSURE: 110 MMHG | BODY MASS INDEX: 24.73 KG/M2 | DIASTOLIC BLOOD PRESSURE: 62 MMHG

## 2022-05-12 DIAGNOSIS — R31.29 MICROHEMATURIA: ICD-10-CM

## 2022-05-12 DIAGNOSIS — R30.0 DYSURIA: ICD-10-CM

## 2022-05-12 DIAGNOSIS — N40.1 BENIGN LOCALIZED PROSTATIC HYPERPLASIA WITH LOWER URINARY TRACT SYMPTOMS (LUTS): Primary | ICD-10-CM

## 2022-05-12 PROCEDURE — 99213 OFFICE O/P EST LOW 20 MIN: CPT | Performed by: UROLOGY

## 2022-05-12 NOTE — PROGRESS NOTES
NADEGE MAHMOOD [facility]        [address] [address]  Dept: [phone]  Dept Fax: [phone]: [address] [address] Urology Office Note -     Patient:  Swathi Benitez  YOB: 1980  Date: 5/12/2022    The patient is a 43 y.o. male who presents today for evaluation of the following problems:   Chief Complaint   Patient presents with    Follow-up     hx of lymph node swelling-pcp advised after last US to f/u with us     referred/consultation requested by DENISSE Hammond CNP. HISTORY OF PRESENT ILLNESS:     LUTS  Doing better since starting pelvic floor therapy and cutting back on caffeine. Urinating well. Decreased frequency, decreased urgency. Dysuria resolved. No hematuria. No pelvic pain. S/p cystoscope 05/18/21- cystitis, hypervascular, mild intravesical extension of prostate, LLH +1, - normal cysto otherwise. UA this visit showed trace blood. Otherwise WNLs. Was on flomax in the past, but it made things worse. Requested/reviewed records from DENISSE Hammond CNP office and/or outside physician/EMR    (Patient's old records have been requested, reviewed and pertinent findings summarized in today's note.)    Procedures Today: N/A      Last several PSA's:  No results found for: PSA    Last total testosterone:  No results found for: TESTOSTERONE    Urinalysis today:  No results found for this visit on 05/12/22. Last BUN and creatinine:  No results found for: BUN  No results found for: CREATININE      Imaging Reviewed during this Office Visit:   Mumtaz Bonilla MD independently reviewed the images and verified the radiology reports from:    No results found. PAST MEDICAL, FAMILY AND SOCIAL HISTORY:  History reviewed. No pertinent past medical history. History reviewed. No pertinent surgical history.   Family History   Problem Relation Age of Onset    No Known Problems Father     Cancer Maternal Grandmother     Heart Disease Maternal Grandmother     Diabetes Maternal Grandmother      Outpatient Medications Marked as Taking for the 22 encounter (Office Visit) with Caroline Sin MD   Medication Sig Dispense Refill    Fexofenadine HCl (ALLEGRA PO) Take by mouth daily      IBUPROFEN PO Take by mouth         Patient has no known allergies. Social History     Tobacco Use   Smoking Status Former Smoker    Quit date:     Years since quittin.3   Smokeless Tobacco Never Used      (If patient a smoker, smoking cessation counseling offered)   Social History     Substance and Sexual Activity   Alcohol Use Yes       REVIEW OF SYSTEMS:  Constitutional: negative  Eyes: negative  Respiratory: negative  Cardiovascular: negative  Gastrointestinal: negative  Genitourinary: see HPI  Musculoskeletal: negative  Skin: negative   Neurological: negative  Hematological/Lymphatic: negative  Psychological: negative      Physical Exam:    This a 43 y.o. male  Vitals:    22 1543   BP: 110/62     Body mass index is 24.66 kg/m². Constitutional: Patient in no acute distress;       Assessment and Plan        1. Benign localized prostatic hyperplasia with lower urinary tract symptoms (LUTS)    2. Dysuria    3. Microhematuria          Plan:      LUTS - Improved. Cysto 21- cystitis, hypervascular, mild intravesical extension of prostate, LLH +1, - normal cysto otherwise  Stopped flomax due to retrograde ejaculation  Continue Pelvic floor therapy. This is one of the only things that has been helpful. With conservative management and stress relief he has seen much improvement  Microhematuria- workup negative in past. Recheck ua in one year. Groin lymph nodes not of urologic significance. They have since resolved. Keep follow up appointment with ua prior with [facility]    Prescriptions Ordered:  No orders of the defined types were placed in this encounter. Orders Placed:   No orders of the defined types were placed in this encounter.            Natalia Ocampo MD

## 2022-12-08 ENCOUNTER — OFFICE VISIT (OUTPATIENT)
Dept: UROLOGY | Age: 42
End: 2022-12-08
Payer: COMMERCIAL

## 2022-12-08 VITALS — WEIGHT: 173 LBS | HEIGHT: 69 IN | RESPIRATION RATE: 16 BRPM | BODY MASS INDEX: 25.62 KG/M2

## 2022-12-08 DIAGNOSIS — R31.29 MICROHEMATURIA: Primary | ICD-10-CM

## 2022-12-08 DIAGNOSIS — R30.0 DYSURIA: ICD-10-CM

## 2022-12-08 DIAGNOSIS — N40.1 BENIGN LOCALIZED PROSTATIC HYPERPLASIA WITH LOWER URINARY TRACT SYMPTOMS (LUTS): ICD-10-CM

## 2022-12-08 PROCEDURE — 99214 OFFICE O/P EST MOD 30 MIN: CPT | Performed by: UROLOGY

## 2022-12-08 PROCEDURE — 81003 URINALYSIS AUTO W/O SCOPE: CPT | Performed by: UROLOGY

## 2022-12-08 RX ORDER — AMITRIPTYLINE HYDROCHLORIDE 10 MG/1
10 TABLET, FILM COATED ORAL NIGHTLY
Qty: 90 TABLET | Refills: 1 | Status: SHIPPED | OUTPATIENT
Start: 2022-12-08

## 2022-12-08 NOTE — PROGRESS NOTES
NADEGE Wood MD        [facility] [address] [address]  Dept: [phone]  Dept Fax: [phone] : [address] Urology Office Note -     Patient:  Precious Santillan  YOB: 1980  Date: 12/8/2022    The patient is a 43 y.o. male who presents today for evaluation of the following problems:   Chief Complaint   Patient presents with    Benign Prostatic Hypertrophy    referred/consultation requested by DENISSE Espana CNP. HISTORY OF PRESENT ILLNESS:     LUTS  Doing better since starting pelvic floor therapy and cutting back on caffeine. Dysuria resolved. No hematuria. No pelvic pain. S/p cystoscope 05/18/21- cystitis, hypervascular, mild intravesical extension of prostate, LLH +1, - normal cysto otherwise. UA normal.  Was on flomax in the past, but it made things worse.    [place]ass 20    Requested/reviewed records from DENISSE Espana CNP office and/or outside physician/EMR    (Patient's old records have been requested, reviewed and pertinent findings summarized in today's note.)    Procedures Today: N/A      Last several PSA's:  No results found for: PSA    Last total testosterone:  No results found for: TESTOSTERONE    Urinalysis today:  Results for POC orders placed in visit on 12/08/22   POCT Urinalysis No Micro (Auto)   Result Value Ref Range    Glucose, Ur Negative NEGATIVE mg/dl    Bilirubin Urine Negative     Ketones, Urine Negative NEGATIVE    Specific Gravity, Urine 1.025 1.002 - 1.030    Blood, UA POC Trace-intact NEGATIVE    pH, Urine 7.00 5.0 - 9.0    Protein, Urine Negative NEGATIVE mg/dl    Urobilinogen, Urine 0.20 0.0 - 1.0 eu/dl    Nitrite, Urine Negative NEGATIVE    Leukocyte Clumps, Urine Negative NEGATIVE    Color, Urine Yellow YELLOW-STRAW    Character, Urine Clear [id number]       Last BUN and creatinine:  No results found for: BUN  No results found for: CREATININE      Imaging Reviewed during this Office Visit:   Marcella Schaumann, MD independently reviewed the images and verified the radiology reports from:    No results found. PAST MEDICAL, FAMILY AND SOCIAL HISTORY:  No past medical history on file. No past surgical history on file. Family History   Problem Relation Age of Onset    No Known Problems Father     Cancer Maternal Grandmother     Heart Disease Maternal Grandmother     Diabetes Maternal Grandmother      Outpatient Medications Marked as Taking for the 22 encounter (Office Visit) with Jasmyne Almaraz MD   Medication Sig Dispense Refill    amitriptyline (ELAVIL) 10 MG tablet Take 1 tablet by mouth nightly 90 tablet 1    Fexofenadine HCl (ALLEGRA PO) Take by mouth daily      IBUPROFEN PO Take by mouth         Patient has no known allergies. Social History     Tobacco Use   Smoking Status Former    Types: Cigarettes    Quit date:     Years since quittin.9   Smokeless Tobacco Never      (If patient a smoker, smoking cessation counseling offered)   Social History     Substance and Sexual Activity   Alcohol Use Yes       REVIEW OF SYSTEMS:  Constitutional: negative  Eyes: negative  Respiratory: negative  Cardiovascular: negative  Gastrointestinal: negative  Genitourinary: see HPI  Musculoskeletal: negative  Skin: negative   Neurological: negative  Hematological/Lymphatic: negative  Psychological: negative      Physical Exam:    This a 43 y.o. male  Vitals:    22 1537   Resp: 16     Body mass index is 25.55 kg/m². Constitutional: Patient in no acute distress;       Assessment and Plan        1. Microhematuria    2. Benign localized prostatic hyperplasia with lower urinary tract symptoms (LUTS)    3. Dysuria          Plan:      LUTS - Improved. Cysto 21- cystitis, hypervascular, mild intravesical extension of prostate, LLH +1, - normal cysto otherwise  Stopped flomax due to retrograde ejaculation  Continue Pelvic floor therapy.  This is one of the only things that has been helpful. With conservative management and stress relief he had seen much improvement but now it's worsening. Microhematuria- workup negative in past. Recheck ua in one year. Start elavil for male IC- pt is not at goal and having worsening voiding and discomfort.    F/u 3 months    Prescriptions Ordered:  Orders Placed This Encounter   Medications    amitriptyline (ELAVIL) 10 MG tablet     Sig: Take 1 tablet by mouth nightly     Dispense:  90 tablet     Refill:  1        Orders Placed:  Orders Placed This Encounter   Procedures    POCT Urinalysis No Micro (Auto)            NADEGE Austin MD